# Patient Record
Sex: FEMALE | Race: WHITE | NOT HISPANIC OR LATINO | URBAN - METROPOLITAN AREA
[De-identification: names, ages, dates, MRNs, and addresses within clinical notes are randomized per-mention and may not be internally consistent; named-entity substitution may affect disease eponyms.]

---

## 2017-05-25 ENCOUNTER — APPOINTMENT (OUTPATIENT)
Dept: URBAN - METROPOLITAN AREA CLINIC 217 | Age: 77
Setting detail: DERMATOLOGY
End: 2017-05-25

## 2017-05-25 DIAGNOSIS — L81.4 OTHER MELANIN HYPERPIGMENTATION: ICD-10-CM

## 2017-05-25 DIAGNOSIS — L40.0 PSORIASIS VULGARIS: ICD-10-CM

## 2017-05-25 PROBLEM — L20.84 INTRINSIC (ALLERGIC) ECZEMA: Status: ACTIVE | Noted: 2017-05-25

## 2017-05-25 PROCEDURE — OTHER MIPS QUALITY: OTHER

## 2017-05-25 PROCEDURE — OTHER PATIENT SPECIFIC COUNSELING: OTHER

## 2017-05-25 PROCEDURE — OTHER COUNSELING: OTHER

## 2017-05-25 PROCEDURE — OTHER TREATMENT REGIMEN: OTHER

## 2017-05-25 PROCEDURE — 99202 OFFICE O/P NEW SF 15 MIN: CPT

## 2017-05-25 PROCEDURE — OTHER PRESCRIPTION: OTHER

## 2017-05-25 RX ORDER — HYDROCORTISONE 25 MG/G
CREAM TOPICAL
Qty: 1 | Refills: 3 | Status: ERX | COMMUNITY
Start: 2017-05-25

## 2017-05-25 ASSESSMENT — LOCATION DETAILED DESCRIPTION DERM
LOCATION DETAILED: LEFT PROXIMAL DORSAL FOREARM
LOCATION DETAILED: RIGHT PROXIMAL DORSAL FOREARM

## 2017-05-25 ASSESSMENT — LOCATION SIMPLE DESCRIPTION DERM
LOCATION SIMPLE: RIGHT FOREARM
LOCATION SIMPLE: LEFT FOREARM

## 2017-05-25 ASSESSMENT — LOCATION ZONE DERM: LOCATION ZONE: ARM

## 2017-05-25 ASSESSMENT — PGA PSORIASIS: PGA PSORIASIS: MILD (MILD PLAQUE ELEVATION, LIGHT ERYTHEMA, FINE SCALE PREDOMINATES)

## 2017-05-25 NOTE — PROCEDURE: TREATMENT REGIMEN
Action 1: Continue
Discontinue Regimen: Fluocinonide 0.05% gel
Start Regimen: Hydrocortisone 2.5% cream BID PRN
Detail Level: Detailed

## 2017-05-25 NOTE — HPI: SKIN LESIONS
How Severe Is Your Skin Lesion?: mild
Have Your Skin Lesions Been Treated?: not been treated
Is This A New Presentation, Or A Follow-Up?: Skin Lesions
Which Family Member (Optional)?: Son